# Patient Record
Sex: MALE | Race: OTHER
[De-identification: names, ages, dates, MRNs, and addresses within clinical notes are randomized per-mention and may not be internally consistent; named-entity substitution may affect disease eponyms.]

---

## 2018-10-31 NOTE — PHYS DOC
Adult General


Chief Complaint


Chief Complaint:  SEXUALLY TRANSMITTED DISEASE





HPI


HPI





Patient is a 23  year old male presented ER today for evaluation of burning 

with urination patient said his girlfriend was tested positive for chlamydia 

couple days ago, he wanted to be treated for STD.  He denies any penile 

discharge, however he said whenever he urinated it was burning. Patient denies 

any fever, no nausea, no vomiting, no abdominal pain.





Review of Systems


Review of Systems





Constitutional: Denies fever or chills []


Eyes: Denies change in visual acuity, redness, or eye pain []


HENT: Denies nasal congestion or sore throat []


Respiratory: Denies cough or shortness of breath []


Cardiovascular: No additional information not addressed in HPI []


GI: Denies abdominal pain, nausea, vomiting, bloody stools or diarrhea []


: positive for burning with urination, no penile discharge. 


Musculoskeletal: Denies back pain or joint pain []


Integument: Denies rash or skin lesions []


Neurologic: Denies headache, focal weakness or sensory changes []


Endocrine: Denies polyuria or polydipsia []





All other systems were reviewed and found to be within normal limits, except as 

documented in this note.





Current Medications


Current Medications





Current Medications








 Medications


  (Trade)  Dose


 Ordered  Sig/Yeni  Start Time


 Stop Time Status Last Admin


Dose Admin


 


 Azithromycin


  (Zithromax)  1,000 mg  1X  ONCE  10/31/18 14:15


 10/31/18 14:16 DC 10/31/18 14:23


1,000 MG


 


 Ceftriaxone Sodium


  (Rocephin Im)  250 mg  1X  ONCE  10/31/18 14:15


 10/31/18 14:16 DC 10/31/18 14:22


250 MG











Allergies


Allergies





Allergies








Coded Allergies Type Severity Reaction Last Updated Verified


 


  No Known Drug Allergies    10/31/18 No











Physical Exam


Physical Exam





Constitutional: Well developed, well nourished, no acute distress, non-toxic 

appearance. []


HENT: Normocephalic, atraumatic, bilateral external ears normal, oropharynx 

moist, no oral exudates, nose normal. []


Eyes: PERRLA, EOMI, conjunctiva normal, no discharge. [] 


Neck: Normal range of motion, no tenderness, supple, no stridor. [] 


Cardiovascular:Heart rate regular rhythm, no murmur []


Lungs & Thorax:  Bilateral breath sounds clear to auscultation []


Abdomen: Bowel sounds normal, soft, no tenderness, no masses, no pulsatile 

masses. Patient declined male exame.


Skin: Warm, dry, no erythema, no rash. [] 


Back: No tenderness, no CVA tenderness. [] 


Extremities: No tenderness, no cyanosis, no clubbing, ROM intact, no edema. [] 


Neurologic: Alert and oriented X 3, normal motor function, normal sensory 

function, no focal deficits noted. []


Psychologic: Affect normal, judgement normal, mood normal. []





Current Patient Data


Vital Signs





 Vital Signs








  Date Time  Temp Pulse Resp B/P (MAP) Pulse Ox O2 Delivery O2 Flow Rate FiO2


 


10/31/18 14:14 98.7 64 16 126/69 (88) 99 Room Air  





 98.7       











EKG


EKG


[]





Radiology/Procedures


Radiology/Procedures


[]





Course & Med Decision Making


Course & Med Decision Making


Pertinent Labs and Imaging studies reviewed. (See chart for details)





Patient was given 250 mg of Rocephin IM, 1 g of Zithromax by mouth in the ER. 

Sexual transmitted disease information was given to him.





Dragon Disclaimer


Dragon Disclaimer


This electronic medical record was generated, in whole or in part, using a 

voice recognition dictation system.





Departure


Departure


Impression:  


 Primary Impression:  


 Urethritis


 Additional Impression:  


 STD (male)


Disposition:  01 HOME, SELF-CARE


Condition:  STABLE


Patient Instructions:  Sexuality and Disability, Urethritis, Adult





Additional Instructions:  


No sexual intercourse with your partner until you and your partner both treated.





Problem Qualifiers











ANGELICA GODDARD DO Oct 31, 2018 14:09

## 2018-12-26 NOTE — PHYS DOC
Past Medical History


Past Medical History:  No Pertinent History


Past Surgical History:  No Surgical History


Alcohol Use:  None


Drug Use:  None





Adult General


Chief Complaint


Chief Complaint:  PAIN ON URINATION





HPI


HPI





Patient is a 23  year old male with history of STDs of presents today 

complaining of penile discharge that began today. Patient states his had 

unprotected sex with multiple partners and is concerned he has an STD again.





Review of Systems


Review of Systems





Constitutional: Denies fever or chills []


: Reports penile discharge. Denies dysuria or hematuria []


Musculoskeletal: Denies back pain or joint pain []


Integument: Denies rash or skin lesions []


Neurologic: Denies headache, focal weakness or sensory changes []





All other systems were reviewed and found to be within normal limits, except as 

documented in this note.





Allergies


Allergies





Allergies








Coded Allergies Type Severity Reaction Last Updated Verified


 


  No Known Drug Allergies    10/31/18 No











Physical Exam


Physical Exam





Constitutional: Well developed, well nourished, no acute distress, non-toxic 

appearance. []


Abdomen: Bowel sounds normal, soft, no tenderness, no masses, no pulsatile 

masses. [] 


Male  exam:deferred per patient request. 


Skin: Warm, dry, no erythema, no rash. [] 


Back: No tenderness, no CVA tenderness. [] 


Extremities: No tenderness, no cyanosis, no clubbing, ROM intact, no edema. [] 


Neurologic: Alert and oriented X 3, normal motor function, normal sensory 

function, no focal deficits noted. []


Psychologic: Affect normal, judgement normal, mood normal. []





EKG


EKG


[]





Radiology/Procedures


Radiology/Procedures


[]





Course & Med Decision Making


Course & Med Decision Making


Pertinent Labs and Imaging studies reviewed. (See chart for details)





This is a 23-year-old male patient with history of STDs presenting today 

complaining of nose discharge since this morning, has had unprotected sex with 

multiple partners. Patient was treated prophylaxis. Provided STD education 

again.





Dragon Disclaimer


Dragon Disclaimer


This electronic medical record was generated, in whole or in part, using a 

voice recognition dictation system.





Departure


Departure


Impression:  


 Primary Impression:  


 Concern about STD in male without diagnosis


Disposition:  01 HOME, SELF-CARE


Condition:  STABLE


Referrals:  


NO PCP (PCP)


Follow-up with the health department for further STD concerns


Patient Instructions:  Sexually Transmitted Disease, Easy-to-Read





Additional Instructions:  


You were treated in the emergency room for sexually transmitted diseases. Use 

protection at all times. Contact all your sex partners, let them know you were 

treated for STDs and ask them to seek treatment too. Please follow-up with the 

health department for further STD concerns.











CHU GIBBONS Dec 26, 2018 23:19

## 2019-08-10 ENCOUNTER — HOSPITAL ENCOUNTER (EMERGENCY)
Dept: HOSPITAL 61 - ER | Age: 24
Discharge: HOME | End: 2019-08-10
Payer: SELF-PAY

## 2019-08-10 VITALS — DIASTOLIC BLOOD PRESSURE: 75 MMHG | SYSTOLIC BLOOD PRESSURE: 124 MMHG

## 2019-08-10 VITALS — BODY MASS INDEX: 21.97 KG/M2 | HEIGHT: 67 IN | WEIGHT: 140 LBS

## 2019-08-10 DIAGNOSIS — H66.92: Primary | ICD-10-CM

## 2019-08-10 PROCEDURE — 99283 EMERGENCY DEPT VISIT LOW MDM: CPT

## 2019-08-10 NOTE — PHYS DOC
Past Medical History


Past Medical History:  STD


Past Surgical History:  No Surgical History


Alcohol Use:  None


Drug Use:  Marijuana





Adult General


Chief Complaint


Chief Complaint:  EARACHE/EAR PAIN





HPI


HPI





Patient is a 23  year old male who presents with complaining of left earache. 

Patient complaining of sudden onset of left earache about 2 hours ago as a 

constant pressure pain without it discharge, fever and chills, recent URI 

symptom or swimming.





Review of Systems


Review of Systems





Constitutional: Denies fever or chills []


Eyes: Denies change in visual acuity, redness, or eye pain []


HENT: Denies nasal congestion or sore throat, reports ear pain


Respiratory: Denies cough or shortness of breath []


Cardiovascular: No additional information not addressed in HPI []


GI: Denies abdominal pain, nausea, vomiting, bloody stools or diarrhea []


: Denies dysuria or hematuria []


Musculoskeletal: Denies back pain or joint pain []


Integument: Denies rash or skin lesions []


Neurologic: Denies headache, focal weakness or sensory changes []


Endocrine: Denies polyuria or polydipsia []





All other systems were reviewed and found to be within normal limits, except as 

documented in this note.





Current Medications


Current Medications





Current Medications








 Medications


  (Trade)  Dose


 Ordered  Sig/Yeni  Start Time


 Stop Time Status Last Admin


Dose Admin


 


 Ibuprofen


  (Motrin)  800 mg  1X  ONCE  8/10/19 08:00


 8/10/19 08:01 DC 8/10/19 08:06


800 MG











Allergies


Allergies





Allergies








Coded Allergies Type Severity Reaction Last Updated Verified


 


  No Known Drug Allergies    10/31/18 No











Physical Exam


Physical Exam





Constitutional: Well developed, well nourished, mild distress, non-toxic appe

arance. []


HENT: Normocephalic, atraumatic, right tympanic membrane normal, left tympanic 

membrane with erythema and bulging without perforation or discharge, oropharynx 

moist, no oral exudates, nose normal. []


Eyes: PERRLA, EOMI, conjunctiva normal, no discharge. [] 


Neck: Normal range of motion, no tenderness, supple, no stridor. [] 


Cardiovascular:Heart rate regular rhythm, no murmur []


Lungs & Thorax:  Bilateral breath sounds clear to auscultation []


Neurologic: Alert and oriented X 3, normal motor function, normal sensory 

function, no focal deficits noted. []


Psychologic: Affect normal, judgement normal, mood normal. []





Current Patient Data


Vital Signs





                                   Vital Signs








  Date Time  Temp Pulse Resp B/P (MAP) Pulse Ox O2 Delivery O2 Flow Rate FiO2


 


8/10/19 07:42 97.9 72 16 124/75 (91) 96 Room Air  





 97.9       











EKG


EKG


[]





Radiology/Procedures


Radiology/Procedures


[]





Course & Med Decision Making


Course & Med Decision Making


Evaluation of patient showed 23-year-old male patient with complaining of left 

ear pain. Patient had left tympanic membrane erythema and edema and treated with

 ibuprofen in ER and prescription for ibuprofen and amoxicillin and Norco was 

given.





Dragon Disclaimer


Dragon Disclaimer


This electronic medical record was generated, in whole or in part, using a voice

 recognition dictation system.





Departure


Departure


Impression:  


   Primary Impression:  


   Left otitis media


Disposition:  01 HOME, SELF-CARE (at 0800)


Condition:  STABLE


Referrals:  


NO PCP (PCP)


Patient Instructions:  Otitis Media, Adult





Additional Instructions:  


Drink plenty of liquids


Follow-up with your primary care physician in 3-5 days


Return to ER if not getting better


Scripts


Hydrocodone/Apap 5-325 (NORCO 5-325 TABLET) 1 Each Tablet


1 TAB PO PRN Q6HRS PRN for PAIN, #10 TAB 0 Refills


   Prov: PRINCESS HAYDEN MD         8/10/19 


Ibuprofen (IBUPROFEN) 800 Mg Tablet


800 MG PO PRN Q8HRS PRN for INFLAMMATION, #20 TAB


   Prov: PRINCESS HAYDEN MD         8/10/19 


Amoxicillin (AMOXICILLIN) 500 Mg Capsule


1 CAP PO Q8HRS for infection, #30 CAP


   Prov: PRINCESS HAYDEN MD         8/10/19





Problem Qualifiers








   Primary Impression:  


   Left otitis media


   Otitis media type:  unspecified  Qualified Codes:  H66.92 - Otitis media, 

   unspecified, left ear








PRINCESS HAYDEN MD             Aug 10, 2019 07:56

## 2020-10-03 ENCOUNTER — HOSPITAL ENCOUNTER (EMERGENCY)
Dept: HOSPITAL 61 - ER | Age: 25
Discharge: HOME | End: 2020-10-03
Payer: SELF-PAY

## 2020-10-03 VITALS — DIASTOLIC BLOOD PRESSURE: 77 MMHG | SYSTOLIC BLOOD PRESSURE: 124 MMHG

## 2020-10-03 VITALS — BODY MASS INDEX: 22.8 KG/M2 | WEIGHT: 145.28 LBS | HEIGHT: 67 IN

## 2020-10-03 DIAGNOSIS — S61.512A: Primary | ICD-10-CM
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
## 2020-10-03 DIAGNOSIS — F12.90: ICD-10-CM
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
## 2020-10-03 PROCEDURE — 73110 X-RAY EXAM OF WRIST: CPT

## 2020-10-03 PROCEDURE — 90715 TDAP VACCINE 7 YRS/> IM: CPT

## 2020-10-03 PROCEDURE — 12002 RPR S/N/AX/GEN/TRNK2.6-7.5CM: CPT

## 2020-10-03 PROCEDURE — 99283 EMERGENCY DEPT VISIT LOW MDM: CPT

## 2020-10-03 PROCEDURE — 90471 IMMUNIZATION ADMIN: CPT
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
## 2020-10-03 NOTE — RAD
Study: CR WRIST 3V LEFT

 

Indication: Laceration.

 

Comparison: None.

 

Findings:

 

No acute fracture. Alignment is anatomic. No retained radiopaque foreign 

body.

 

Impression:

 

No acute osseous abnormality or retained radiodense foreign body.

 

Electronically signed by: CHARLES HAJI MD (10/3/2020 9:10 PM) [id number]

## 2021-08-30 ENCOUNTER — HOSPITAL ENCOUNTER (EMERGENCY)
Dept: HOSPITAL 61 - ER | Age: 26
LOS: 1 days | Discharge: HOME | End: 2021-08-31
Payer: SELF-PAY

## 2021-08-30 VITALS — WEIGHT: 180.34 LBS | HEIGHT: 67 IN | BODY MASS INDEX: 28.3 KG/M2

## 2021-08-30 DIAGNOSIS — J06.9: ICD-10-CM

## 2021-08-30 DIAGNOSIS — Z20.822: ICD-10-CM

## 2021-08-30 DIAGNOSIS — H66.92: Primary | ICD-10-CM

## 2021-08-30 PROCEDURE — 99283 EMERGENCY DEPT VISIT LOW MDM: CPT

## 2021-08-30 PROCEDURE — U0003 INFECTIOUS AGENT DETECTION BY NUCLEIC ACID (DNA OR RNA); SEVERE ACUTE RESPIRATORY SYNDROME CORONAVIRUS 2 (SARS-COV-2) (CORONAVIRUS DISEASE [COVID-19]), AMPLIFIED PROBE TECHNIQUE, MAKING USE OF HIGH THROUGHPUT TECHNOLOGIES AS DESCRIBED BY CMS-2020-01-R: HCPCS

## 2021-08-31 VITALS — DIASTOLIC BLOOD PRESSURE: 87 MMHG | SYSTOLIC BLOOD PRESSURE: 122 MMHG

## 2021-08-31 NOTE — PHYS DOC
Past Medical History


Past Medical History:  No Pertinent History, STD


Past Surgical History:  Other


Additional Past Surgical Histo:  RIGHT KNEE


Smoking Status:  Never Smoker


Alcohol Use:  None


Drug Use:  Marijuana





General Adult


EDM:


Chief Complaint:  EARACHE/EAR PAIN





HPI:


HPI:





Patient is a 25  year old male who presents with left-sided earache for the past

3 days.  States that this was preceded by sore throat, runny nose, cough.  His 

last tactile fever was approximately 2 to 3 days ago.  His other symptoms have 

been improving.  States that he has many previous middle ear infections.  States

that he gets antibiotics approximately once a year.  Denies any long-term 

medical problems.  Has not had a Covid test during this illness.





Review of Systems:


Review of Systems:


Constitutional: Previous fever and chills.  []


Eyes:   Denies change in visual acuity. []


HENT:   + Left ear pain.  Improving nasal congestion and sore throat. [] 


Respiratory:   Denies cough or shortness of breath. [] 


Cardiovascular:   Denies chest pain or edema. [] 


GI:   Denies abdominal pain, nausea, vomiting, bloody stools or diarrhea. [] 


:  Denies dysuria. [] 


Musculoskeletal:   Denies back pain or joint pain. [] 


Integument:   Denies rash. [] 


Neurologic:   Denies headache, focal weakness or sensory changes. [] 


Endocrine:   Denies polyuria or polydipsia. [] 


Lymphatic:  Denies swollen glands. [] 


Psychiatric:  Denies depression or anxiety. []





Heart Score:


C/O Chest Pain:  No


Risk Factors:


Risk Factors:  DM, Current or recent (<one month) smoker, HTN, HLP, family 

history of CAD, obesity.


Risk Scores:


Score 0 - 3:  2.5% MACE over next 6 weeks - Discharge Home


Score 4 - 6:  20.3% MACE over next 6 weeks - Admit for Clinical Observation


Score 7 - 10:  72.7% MACE over next 6 weeks - Early Invasive Strategies





Allergies:


Allergies:





Allergies








Coded Allergies Type Severity Reaction Last Updated Verified


 


  No Known Drug Allergies    10/31/18 No











Physical Exam:


PE:





Constitutional: Well developed, well nourished, no acute distress, non-toxic 

appearance. []


HENT: Normocephalic, atraumatic, right TM normal.  External auditory canals 

normal bilaterally.  Left TM slightly injected and bulging.  Partially obscured 

by cerumen.. []


Eyes: PERRLA, EOMI, conjunctiva normal, no discharge. [] 


Neck: Normal range of motion, no tenderness, supple, no stridor. [] 


Cardiovascular:Heart rate regular rhythm, no murmur []


Lungs & Thorax:  Bilateral breath sounds clear to auscultation []


Abdomen: Bowel sounds normal, soft, no tenderness, no masses, no pulsatile 

masses. [] 


Skin: Warm, dry, no erythema, no rash. [] 


Back: No tenderness, no CVA tenderness. [] 


Extremities: No tenderness, no cyanosis, no clubbing, ROM intact, no edema. [] 


Neurologic: Alert and oriented X 3, normal motor function, normal sensory 

function, no focal deficits noted. []


Psychologic: Affect normal, judgement normal, mood normal. []





Current Patient Data:


Vital Signs:





                                   Vital Signs








  Date Time  Temp Pulse Resp B/P (MAP) Pulse Ox O2 Delivery O2 Flow Rate FiO2


 


8/31/21 02:05 98.2 71  122/87 (93) 99 Room Air  





 98.2       











EKG:


EKG:


[]





Radiology/Procedures:


Radiology/Procedures:


[]





Course & Med Decision Making:


Course & Med Decision Making


Pertinent Labs and Imaging studies reviewed. (See chart for details)





Patient a 25-year-old male with left-sided ear pain following a viral URI 

prodrome.  States that he has had many previous ear infections and this feels 

similar.


Exam concerning for AOM.  Will give amoxicillin.  Will check for Covid.


Vitally stable.  Safe for discharge at this time.





Dragon Disclaimer:


Dragon Disclaimer:


This electronic medical record was generated, in whole or in part, using a voice

 recognition dictation system.





Departure


Departure


Impression:  


   Primary Impression:  


   Left acute otitis media


   Additional Impression:  


   Viral URI


Disposition:  01 HOME / SELF CARE / HOMELESS


Condition:  STABLE


Referrals:  


NO PCP (PCP)





Additional Instructions:  


Please take antibiotics as prescribed.


Your Covid test is pending.


We will call you if your result is positive.


If it is positive, you will need to self isolate for at least 10 days since 

symptom onset, and at least 3 days since you were fevers have been gone on your 

symptoms are improving.


Please isolate until you know your results.


Scripts


Amoxicillin (AMOXICILLIN) 500 Mg Capsule


2 CAP PO BID for infection for 7 Days, #28 CAP


   Prov: LUCAS RUANO MD         8/31/21











LUCAS RUANO MD              Aug 31, 2021 02:42